# Patient Record
Sex: FEMALE | Employment: PART TIME | URBAN - METROPOLITAN AREA
[De-identification: names, ages, dates, MRNs, and addresses within clinical notes are randomized per-mention and may not be internally consistent; named-entity substitution may affect disease eponyms.]

---

## 2018-04-23 ENCOUNTER — TELEPHONE (OUTPATIENT)
Dept: OTHER | Facility: CLINIC | Age: 48
End: 2018-04-23

## 2018-04-23 NOTE — TELEPHONE ENCOUNTER
4/23/2018    Call Regarding Onboarding ARE CHOICES    Attempt 1    Message on voicemail     Comments:           Outreach   AT

## 2018-05-18 NOTE — TELEPHONE ENCOUNTER
5/18/2018    Call Regarding Onboarding are Choices    Attempt 2    Message on voicemail     Comments: 0 DEP      Outreach   CC

## 2020-03-02 ENCOUNTER — OFFICE VISIT (OUTPATIENT)
Dept: FAMILY MEDICINE | Facility: CLINIC | Age: 50
End: 2020-03-02
Payer: COMMERCIAL

## 2020-03-02 VITALS
HEIGHT: 58 IN | WEIGHT: 119.2 LBS | SYSTOLIC BLOOD PRESSURE: 98 MMHG | HEART RATE: 78 BPM | OXYGEN SATURATION: 97 % | TEMPERATURE: 98.2 F | BODY MASS INDEX: 25.02 KG/M2 | DIASTOLIC BLOOD PRESSURE: 61 MMHG

## 2020-03-02 DIAGNOSIS — Z30.46 NEXPLANON REMOVAL: Primary | ICD-10-CM

## 2020-03-02 DIAGNOSIS — Z30.017 NEXPLANON INSERTION: ICD-10-CM

## 2020-03-02 DIAGNOSIS — Z12.4 SCREENING FOR MALIGNANT NEOPLASM OF CERVIX: ICD-10-CM

## 2020-03-02 PROBLEM — Z97.5 NEXPLANON IN PLACE: Status: ACTIVE | Noted: 2020-01-17

## 2020-03-02 PROBLEM — D35.2 PROLACTINOMA (H): Status: ACTIVE | Noted: 2017-04-26

## 2020-03-02 PROCEDURE — 87624 HPV HI-RISK TYP POOLED RSLT: CPT | Performed by: FAMILY MEDICINE

## 2020-03-02 PROCEDURE — 11981 INSERTION DRUG DLVR IMPLANT: CPT | Performed by: FAMILY MEDICINE

## 2020-03-02 PROCEDURE — 11982 REMOVE DRUG IMPLANT DEVICE: CPT | Performed by: FAMILY MEDICINE

## 2020-03-02 PROCEDURE — G0145 SCR C/V CYTO,THINLAYER,RESCR: HCPCS | Performed by: FAMILY MEDICINE

## 2020-03-02 PROCEDURE — 99207 ZZC DROP WITH A PROCEDURE: CPT | Performed by: FAMILY MEDICINE

## 2020-03-02 RX ORDER — CABERGOLINE 0.5 MG/1
0.5 TABLET ORAL
COMMUNITY
Start: 2018-11-28

## 2020-03-02 RX ORDER — CETIRIZINE HYDROCHLORIDE 10 MG/1
10 TABLET ORAL
COMMUNITY
Start: 2017-05-01

## 2020-03-02 RX ORDER — AMITRIPTYLINE HYDROCHLORIDE 10 MG/1
10 TABLET ORAL
COMMUNITY
Start: 2020-01-17

## 2020-03-02 SDOH — HEALTH STABILITY: MENTAL HEALTH: HOW OFTEN DO YOU HAVE A DRINK CONTAINING ALCOHOL?: NEVER

## 2020-03-02 ASSESSMENT — PAIN SCALES - GENERAL: PAINLEVEL: NO PAIN (0)

## 2020-03-02 ASSESSMENT — MIFFLIN-ST. JEOR: SCORE: 1055.44

## 2020-03-02 NOTE — LETTER
March 9, 2020    Zoey BaltazarDebra  [address] [facility] [address]    Dear ,  This letter is regarding your recent Pap smear (cervical cancer screening) and Human Papillomavirus (HPV) test.  We are happy to inform you that your Pap smear result is normal. Cervical cancer is closely linked with certain types of HPV. Your results showed no evidence of high-risk HPV.  We recommend you have your next PAP smear and HPV test in 5 years.  You will still need to return to the clinic every year for an annual exam and other preventive tests.  If you have additional questions regarding this result, please call our registered nurse, Shira at [phone].  Sincerely,    Rebeca Cardoso MD/esther

## 2020-03-02 NOTE — PROGRESS NOTES
"Romi Enrique is a 49 year old female who presents to clinic today for the following health issues:    HPI   Nexplanon  Removal and replacement since not having hot flashes or night sweats.      Reviewed and updated as needed this visit by Provider  Tobacco  Allergies  Meds  Problems  Med Hx  Surg Hx  Fam Hx         Review of Systems   ROS COMP: Constitutional, HEENT, cardiovascular, pulmonary, gi and gu systems are negative, except as otherwise noted.      Objective    BP 98/61 (BP Location: Left arm, Patient Position: Chair, Cuff Size: Adult Regular)   Pulse 78   Temp 98.2  F (36.8  C) (Oral)   Ht 1.473 m (4' 10\")   Wt 54.1 kg (119 lb 3.2 oz)   SpO2 97%   BMI 24.91 kg/m    Body mass index is 24.91 kg/m .  Physical Exam   GENERAL: healthy, alert and no distress   (female): normal female external genitalia, normal urethral meatus, vaginal mucosa, normal cervix/adnexa/uterus without masses or discharge  SKIN: no suspicious lesions or rashes  PSYCH: mentation appears normal, affect normal/bright    Diagnostic Test Results:  Labs reviewed in Epic        Assessment & Plan     1. Nexplanon removal  Removal of Nexplanon for birth control    Informed consent reviewed and obtained.  The patient is positioned with her LEFT arm flexed at the elbow.  Previous nexplanon insertion site identified. This area is cleansed with betadine and then injected with 1% lidocaine with epi.  A small stab incision is made at this previous site.  The nexplanon is guided into the incision and grasped with curved clamp and removed.  It was verified to be intact.    There were no complications and minimal blood loss.    - REMOVAL NEXPLANON    2. Nexplanon insertion  Continuing from above:    The Nexplanon device is then placed just under the skin with care not to go too deep.  The device is then slowly removed, leaving the mack behind.  The mack is palpable in the appropriate place under the skin by both myself and " the patient.  The incision is noted to be hemostatic and the area is wrapped with a 4x4 and Coban.      There were no complications and minimal blood loss.    - etonogestrel (NEXPLANON) subdermal implant 68 mg  - INSERTION NON-BIODEGRADABLE DRUG DELIVERY IMPLANT    3. Screening for malignant neoplasm of cervix  Screening  - Pap imaged thin layer screen with HPV - recommended age 30 - 65 years (select HPV order below)       Return in about 1 year (around 3/2/2021).    Rebeca Cardoso MD  [facility]

## 2020-03-02 NOTE — PATIENT INSTRUCTIONS
At [facility], we strive to deliver an exceptional experience to you, every time we see you.  If you receive a survey in the mail, please send us back your thoughts. We really do value your feedback.    Based on your medical history, these are the current health maintenance/preventive care services that you are due for (some may have been done at this visit.)  Health Maintenance Due   Topic Date Due     PREVENTIVE CARE VISIT  1970     DTAP/TDAP/TD IMMUNIZATION (1 - Tdap) 06/20/1981     HIV SCREENING  06/20/1985     PAP  06/20/1991     LIPID  06/20/2015     INFLUENZA VACCINE (1) 09/01/2019     PHQ-2  01/01/2020         Suggested websites for health information:  [email or web address] : Up to date and easily searchable information on multiple topics.  Www.medlineplus.gov : medication info, interactive tutorials, watch real surgeries online  Www.familydoctor.org : good info from the Academy of Family Physicians  Www.cdc.gov : public health info, travel advisories, epidemics (H1N1)  Www.aap.org : children's health info, normal development, vaccinations  Www.health.state.[place].us : [place] dept of health, public health issues in [place], N1N1    Your care team:                            Family Medicine Internal Medicine   MD Ramírez Sánchez MD Shantel Branch-Fleming, MD Katya Georgiev PA-C Nam Ho, MD Pediatrics   ANJEL Ambrose, ROYA Fisher APRN MD Anne Marie Ron MD Deborah Mielke, MD Kim Thein, APRN [facility]      Clinic hours: Monday - Thursday 7 am-7 pm; Fridays 7 am-5 pm.   Urgent care: Monday - Friday 11 am-9 pm; Saturday and Sunday 9 am-5 pm.  Pharmacy : Monday -Thursday 8 am-8 pm; Friday 8 am-6 pm; Saturday and Sunday 9 am-5 pm.     Clinic: [phone]   Pharmacy: [phone]

## 2020-03-04 LAB
COPATH REPORT: NORMAL
PAP: NORMAL

## 2020-03-05 LAB
FINAL DIAGNOSIS: NORMAL
HPV HR 12 DNA CVX QL NAA+PROBE: NEGATIVE
HPV16 DNA SPEC QL NAA+PROBE: NEGATIVE
HPV18 DNA SPEC QL NAA+PROBE: NEGATIVE
SPECIMEN DESCRIPTION: NORMAL
SPECIMEN SOURCE CVX/VAG CYTO: NORMAL

## 2020-06-10 ENCOUNTER — APPOINTMENT (OUTPATIENT)
Dept: INTERPRETER SERVICES | Facility: CLINIC | Age: 50
End: 2020-06-10
Payer: COMMERCIAL